# Patient Record
Sex: FEMALE | Race: WHITE | NOT HISPANIC OR LATINO | URBAN - METROPOLITAN AREA
[De-identification: names, ages, dates, MRNs, and addresses within clinical notes are randomized per-mention and may not be internally consistent; named-entity substitution may affect disease eponyms.]

---

## 2017-03-21 ENCOUNTER — HISTORICAL (OUTPATIENT)
Dept: ADMINISTRATIVE | Facility: HOSPITAL | Age: 47
End: 2017-03-21

## 2017-06-08 ENCOUNTER — HISTORICAL (OUTPATIENT)
Dept: CARDIOLOGY | Facility: CLINIC | Age: 47
End: 2017-06-08

## 2017-06-20 ENCOUNTER — HISTORICAL (OUTPATIENT)
Dept: RADIOLOGY | Facility: HOSPITAL | Age: 47
End: 2017-06-20

## 2017-06-20 LAB
ABS NEUT (OLG): 3.53 X10(3)/MCL (ref 2.1–9.2)
ALBUMIN SERPL-MCNC: 3.8 GM/DL (ref 3.4–5)
ALBUMIN/GLOB SERPL: 1 {RATIO}
ALP SERPL-CCNC: 123 UNIT/L (ref 20–120)
ALT SERPL-CCNC: 23 UNIT/L
ANISOCYTOSIS BLD QL SMEAR: NORMAL
AST SERPL-CCNC: 28 UNIT/L
BASOPHILS NFR BLD MANUAL: 0 %
BILIRUB SERPL-MCNC: 0.5 MG/DL
BILIRUBIN DIRECT+TOT PNL SERPL-MCNC: <0.1 MG/DL
BILIRUBIN DIRECT+TOT PNL SERPL-MCNC: >0.4 MG/DL
BUN SERPL-MCNC: 14 MG/DL (ref 7–25)
CALCIUM SERPL-MCNC: 9.2 MG/DL (ref 8.4–10.3)
CHLORIDE SERPL-SCNC: 106 MMOL/L (ref 96–110)
CHOLEST SERPL-MCNC: 156 MG/DL
CHOLEST/HDLC SERPL: 5.2 {RATIO} (ref 0–4.4)
CO2 SERPL-SCNC: 29 MMOL/L (ref 24–32)
CREAT SERPL-MCNC: 0.74 MG/DL (ref 0.7–1.1)
EOSINOPHIL NFR BLD MANUAL: 4 %
ERYTHROCYTE [DISTWIDTH] IN BLOOD BY AUTOMATED COUNT: 14.3 % (ref 11.5–14.5)
GLOBULIN SER-MCNC: 3.5 GM/ML (ref 2.3–3.5)
GLUCOSE SERPL-MCNC: 95 MG/DL (ref 65–99)
GRANULOCYTES NFR BLD MANUAL: 52 %
HAV IGM SERPL QL IA: NONREACTIVE
HBV CORE IGM SERPL QL IA: NONREACTIVE
HBV SURFACE AG SERPL QL IA: NEGATIVE
HCT VFR BLD AUTO: 38.1 % (ref 35–46)
HCV AB SERPL QL IA: NONREACTIVE
HDLC SERPL-MCNC: 30 MG/DL
HGB BLD-MCNC: 12.4 GM/DL (ref 12–16)
LDLC SERPL CALC-MCNC: 100 MG/DL (ref 0–130)
LYMPHOCYTES NFR BLD MANUAL: 28 %
MCH RBC QN AUTO: 28.8 PG (ref 26–34)
MCHC RBC AUTO-ENTMCNC: 32.5 GM/DL (ref 31–37)
MCV RBC AUTO: 88.4 FL (ref 80–100)
MONOCYTES NFR BLD MANUAL: 6 %
NEUTS BAND NFR BLD MANUAL: 10 %
PLATELET # BLD AUTO: 299 X10(3)/MCL (ref 130–400)
PLATELET # BLD EST: NORMAL 10*3/UL
PMV BLD AUTO: 10.2 FL (ref 7.4–10.4)
POTASSIUM SERPL-SCNC: 4.4 MMOL/L (ref 3.6–5.2)
PROT SERPL-MCNC: 7.3 GM/DL (ref 6–8)
RBC # BLD AUTO: 4.31 X10(6)/MCL (ref 4–5.2)
RBC MORPH BLD: NORMAL
SODIUM SERPL-SCNC: 141 MMOL/L (ref 135–146)
T4 FREE SERPL-MCNC: 0.89 NG/DL (ref 0.6–1.15)
TRIGL SERPL-MCNC: 128 MG/DL
TSH SERPL-ACNC: 2.17 MIU/L (ref 0.5–5)
VLDLC SERPL CALC-MCNC: 26 MG/DL
WBC # SPEC AUTO: 6.4 X10(3)/MCL (ref 4.5–11)

## 2017-09-19 LAB — RAPID GROUP A STREP (OHS): NEGATIVE

## 2017-11-13 ENCOUNTER — HISTORICAL (OUTPATIENT)
Dept: ADMINISTRATIVE | Facility: HOSPITAL | Age: 47
End: 2017-11-13

## 2017-11-13 LAB
ABS NEUT (OLG): 2.32 X10(3)/MCL (ref 2.1–9.2)
BASOPHILS NFR BLD MANUAL: 1 % (ref 0–2)
BUN SERPL-MCNC: 15 MG/DL (ref 7–18)
CALCIUM SERPL-MCNC: 9.5 MG/DL (ref 8.5–10.1)
CHLORIDE SERPL-SCNC: 105 MMOL/L (ref 98–107)
CO2 SERPL-SCNC: 27 MMOL/L (ref 21–32)
CREAT SERPL-MCNC: 0.67 MG/DL (ref 0.55–1.02)
EOSINOPHIL NFR BLD MANUAL: 4 % (ref 0–8)
ERYTHROCYTE [DISTWIDTH] IN BLOOD BY AUTOMATED COUNT: 13.5 % (ref 11.5–17)
GLUCOSE SERPL-MCNC: 87 MG/DL (ref 74–106)
HCT VFR BLD AUTO: 35.2 % (ref 37–47)
HGB BLD-MCNC: 11.5 GM/DL (ref 12–16)
LYMPHOCYTES NFR BLD MANUAL: 24 % (ref 13–40)
MCH RBC QN AUTO: 29.4 PG (ref 27–31)
MCHC RBC AUTO-ENTMCNC: 32.7 GM/DL (ref 33–36)
MCV RBC AUTO: 90 FL (ref 80–94)
MONOCYTES NFR BLD MANUAL: 13 % (ref 2–11)
NEUTROPHILS NFR BLD MANUAL: 58 % (ref 47–80)
PLATELET # BLD AUTO: 244 X10(3)/MCL (ref 130–400)
PLATELET # BLD EST: NORMAL 10*3/UL
PMV BLD AUTO: 9.8 FL (ref 7.4–10.4)
POIKILOCYTOSIS BLD QL SMEAR: 1
POTASSIUM SERPL-SCNC: 3.7 MMOL/L (ref 3.5–5.1)
RBC # BLD AUTO: 3.91 X10(6)/MCL (ref 4.2–5.4)
SODIUM SERPL-SCNC: 140 MMOL/L (ref 136–145)
WBC # SPEC AUTO: 4.6 X10(3)/MCL (ref 4.5–11.5)

## 2018-08-01 ENCOUNTER — HISTORICAL (OUTPATIENT)
Dept: RESPIRATORY THERAPY | Facility: HOSPITAL | Age: 48
End: 2018-08-01

## 2022-04-10 ENCOUNTER — HISTORICAL (OUTPATIENT)
Dept: ADMINISTRATIVE | Facility: HOSPITAL | Age: 52
End: 2022-04-10

## 2022-04-11 ENCOUNTER — HISTORICAL (OUTPATIENT)
Dept: ADMINISTRATIVE | Facility: HOSPITAL | Age: 52
End: 2022-04-11

## 2022-04-28 VITALS
BODY MASS INDEX: 20.45 KG/M2 | WEIGHT: 134.94 LBS | HEIGHT: 68 IN | SYSTOLIC BLOOD PRESSURE: 127 MMHG | DIASTOLIC BLOOD PRESSURE: 78 MMHG | OXYGEN SATURATION: 100 %

## 2022-04-28 VITALS
HEIGHT: 68 IN | OXYGEN SATURATION: 100 % | WEIGHT: 134.94 LBS | SYSTOLIC BLOOD PRESSURE: 127 MMHG | BODY MASS INDEX: 20.45 KG/M2 | DIASTOLIC BLOOD PRESSURE: 78 MMHG

## 2022-08-26 ENCOUNTER — HOSPITAL ENCOUNTER (OUTPATIENT)
Facility: HOSPITAL | Age: 52
Discharge: HOME | End: 2022-08-26
Attending: INTERNAL MEDICINE | Admitting: INTERNAL MEDICINE
Payer: MEDICAID

## 2022-08-26 VITALS
HEART RATE: 62 BPM | DIASTOLIC BLOOD PRESSURE: 75 MMHG | SYSTOLIC BLOOD PRESSURE: 103 MMHG | OXYGEN SATURATION: 93 % | HEIGHT: 71 IN | TEMPERATURE: 98 F | RESPIRATION RATE: 17 BRPM | BODY MASS INDEX: 22.72 KG/M2 | WEIGHT: 162.25 LBS

## 2022-08-26 DIAGNOSIS — I27.20 PROGRESSIVE PULMONARY HYPERTENSION: ICD-10-CM

## 2022-08-26 PROCEDURE — C1894 INTRO/SHEATH, NON-LASER: HCPCS | Performed by: INTERNAL MEDICINE

## 2022-08-26 PROCEDURE — 93451 RIGHT HEART CATH: CPT | Performed by: INTERNAL MEDICINE

## 2022-08-26 PROCEDURE — C1751 CATH, INF, PER/CENT/MIDLINE: HCPCS | Performed by: INTERNAL MEDICINE

## 2022-08-26 PROCEDURE — 99153 MOD SED SAME PHYS/QHP EA: CPT | Performed by: INTERNAL MEDICINE

## 2022-08-26 PROCEDURE — 99152 MOD SED SAME PHYS/QHP 5/>YRS: CPT | Performed by: INTERNAL MEDICINE

## 2022-08-26 PROCEDURE — 25000003 PHARM REV CODE 250: Performed by: INTERNAL MEDICINE

## 2022-08-26 PROCEDURE — 63600175 PHARM REV CODE 636 W HCPCS: Performed by: INTERNAL MEDICINE

## 2022-08-26 PROCEDURE — C1769 GUIDE WIRE: HCPCS | Performed by: INTERNAL MEDICINE

## 2022-08-26 RX ORDER — ACETAMINOPHEN 325 MG/1
650 TABLET ORAL EVERY 4 HOURS PRN
Status: DISCONTINUED | OUTPATIENT
Start: 2022-08-26 | End: 2022-08-26 | Stop reason: HOSPADM

## 2022-08-26 RX ORDER — FUROSEMIDE 40 MG/1
40 TABLET ORAL DAILY
COMMUNITY

## 2022-08-26 RX ORDER — POTASSIUM CHLORIDE 750 MG/1
20 TABLET, EXTENDED RELEASE ORAL ONCE
COMMUNITY

## 2022-08-26 RX ORDER — FENTANYL CITRATE 50 UG/ML
INJECTION, SOLUTION INTRAMUSCULAR; INTRAVENOUS
Status: DISCONTINUED | OUTPATIENT
Start: 2022-08-26 | End: 2022-08-26 | Stop reason: HOSPADM

## 2022-08-26 RX ORDER — DIAZEPAM 5 MG/1
10 TABLET ORAL ONCE
Status: COMPLETED | OUTPATIENT
Start: 2022-08-26 | End: 2022-08-26

## 2022-08-26 RX ORDER — ONDANSETRON 4 MG/1
8 TABLET, ORALLY DISINTEGRATING ORAL EVERY 8 HOURS PRN
Status: DISCONTINUED | OUTPATIENT
Start: 2022-08-26 | End: 2022-08-26 | Stop reason: HOSPADM

## 2022-08-26 RX ORDER — DIPHENHYDRAMINE HCL 25 MG
50 CAPSULE ORAL
Status: DISPENSED | OUTPATIENT
Start: 2022-08-26

## 2022-08-26 RX ORDER — DILTIAZEM HYDROCHLORIDE 30 MG/1
30 TABLET, FILM COATED ORAL 3 TIMES DAILY
COMMUNITY

## 2022-08-26 RX ORDER — MIDAZOLAM HYDROCHLORIDE 1 MG/ML
INJECTION, SOLUTION INTRAMUSCULAR; INTRAVENOUS
Status: DISCONTINUED | OUTPATIENT
Start: 2022-08-26 | End: 2022-08-26 | Stop reason: HOSPADM

## 2022-08-26 RX ORDER — LIDOCAINE HYDROCHLORIDE 10 MG/ML
INJECTION INFILTRATION; PERINEURAL
Status: DISCONTINUED | OUTPATIENT
Start: 2022-08-26 | End: 2022-08-26 | Stop reason: HOSPADM

## 2022-08-26 RX ADMIN — DIPHENHYDRAMINE HYDROCHLORIDE 50 MG: 25 CAPSULE ORAL at 06:08

## 2022-08-26 RX ADMIN — DIAZEPAM 10 MG: 5 TABLET ORAL at 06:08

## 2022-08-26 NOTE — BRIEF OP NOTE
Brief Operative Note:    : Rogelio Kaufman MD     Referring Physician: Self,Aaareferral     All Operators: Surgeon(s):  Rogelio Kaufman MD     Preoperative Diagnosis: Progressive pulmonary hypertension [I27.20]     Postop Diagnosis: Progressive pulmonary hypertension [I27.20]    Treatments/Procedures: Procedure(s) (LRB):  INSERTION, CATHETER, RIGHT HEART (Right)    Findings: RHC done via LCF vein. See catheterization report for full details.    Estimated Blood loss: 20 cc    Specimens removed: No    Rogelio Kaufman MD

## 2022-08-26 NOTE — DISCHARGE SUMMARY
[facility] - Cath Lab Services  Discharge Note  Short Stay    Procedure(s) (LRB):  INSERTION, CATHETER, RIGHT HEART (Right)    OUTCOME: Patient tolerated treatment/procedure well without complication and is now ready for discharge.    DISPOSITION: Home or Self Care    FINAL DIAGNOSIS:  Secundum ASD    FOLLOWUP: In clinic    DISCHARGE INSTRUCTIONS:    Discharge Procedure Orders   Diet general        TIME SPENT ON DISCHARGE: 31 minutes

## 2022-08-26 NOTE — NURSING
When giving pt DC instructions, she kept asking why she couldn't drive herself home. I explained to her that due to medications given during procedure (fentanyl and versed) and valium by mouth. Reiterated that she would not be able to drive for at least 48 hours. Pt states that it isn't her car and that her mother cant drive that car. Mother stated she would drive pt home. I then told mother she could go get the car, they then started saying that pt had to go with mother to get the car because she didn't know where it was parked, what the car looked like, or how to start the car. I explained to them that we couldn't bring her to get the car, as we had to see pt actually get in the car with mother driving. Stated they understood. Informed charge nurse JAMIA Chew of this. He spoke with pt and mother, and he reiterated everything I had told them, they said okay.Pt wheeled out with pt transport

## 2022-08-26 NOTE — INTERVAL H&P NOTE
Patient name: Letitia Gibson  MRN: [id number]  : 1970  Cath Lab Procedure H&P Update    Pre-Procedure Assessment:    I saw and examined the patient face to face. The patient has been re-evaluated and her condition is unchanged. The reason for admission, procedure and care is still present.  Based on the patients H&P, pre-procedure physical exam, relevant diagnostic studies, NPO status and information obtained from the patient, I determine the patient is an appropriate candidate for the proposed procedure and anesthesia planned. I further certify the anesthesia risks, benefits and options have been explained to the patient to which she agrees as documented on the procedural consent.

## 2022-08-26 NOTE — Clinical Note
The PA catheter was inserted into the inferior vena cava. Hemodynamics were performed. repositioned to RA, RV, PA and wedge. sats obtained

## 2022-09-21 ENCOUNTER — HISTORICAL (OUTPATIENT)
Dept: ADMINISTRATIVE | Facility: HOSPITAL | Age: 52
End: 2022-09-21
Payer: MEDICAID

## 2022-10-04 ENCOUNTER — OFFICE VISIT (OUTPATIENT)
Dept: CARDIOLOGY | Facility: CLINIC | Age: 52
End: 2022-10-04
Payer: MEDICAID

## 2022-10-04 DIAGNOSIS — Q21.11 ASD (ATRIAL SEPTAL DEFECT), OSTIUM SECUNDUM: ICD-10-CM

## 2022-10-04 DIAGNOSIS — Q21.11 ASD (ATRIAL SEPTAL DEFECT), OSTIUM SECUNDUM: Primary | ICD-10-CM

## 2022-10-04 PROCEDURE — 99203 PR OFFICE/OUTPT VISIT, NEW, LEVL III, 30-44 MIN: ICD-10-PCS | Mod: 95,,, | Performed by: INTERNAL MEDICINE

## 2022-10-04 PROCEDURE — 1160F PR REVIEW ALL MEDS BY PRESCRIBER/CLIN PHARMACIST DOCUMENTED: ICD-10-PCS | Mod: CPTII,95,, | Performed by: INTERNAL MEDICINE

## 2022-10-04 PROCEDURE — 1159F PR MEDICATION LIST DOCUMENTED IN MEDICAL RECORD: ICD-10-PCS | Mod: CPTII,95,, | Performed by: INTERNAL MEDICINE

## 2022-10-04 PROCEDURE — 1159F MED LIST DOCD IN RCRD: CPT | Mod: CPTII,95,, | Performed by: INTERNAL MEDICINE

## 2022-10-04 PROCEDURE — 1160F RVW MEDS BY RX/DR IN RCRD: CPT | Mod: CPTII,95,, | Performed by: INTERNAL MEDICINE

## 2022-10-04 PROCEDURE — 99203 OFFICE O/P NEW LOW 30 MIN: CPT | Mod: 95,,, | Performed by: INTERNAL MEDICINE

## 2022-10-04 NOTE — PROGRESS NOTES
The patient location is: work  The chief complaint leading to consultation is: asd    Visit type: audio only    Face to Face time with patient: 20  45 minutes of total time spent on the encounter, which includes face to face time and non-face to face time preparing to see the patient (eg, review of tests), Obtaining and/or reviewing separately obtained history, Documenting clinical information in the electronic or other health record, Independently interpreting results (not separately reported) and communicating results to the patient/family/caregiver, or Care coordination (not separately reported).         Each patient to whom he or she provides medical services by telemedicine is:  (1) informed of the relationship between the physician and patient and the respective role of any other health care provider with respect to management of the patient; and (2) notified that he or she may decline to receive medical services by telemedicine and may withdraw from such care at any time.    Notes:   52-year-old female with ASD and significant step-up with left-to-right shunt referred by Dr. cristiana garcia in [place].  She is symptomatic with shortness of breath and chest pain which began in the last 6 months.  Her transesophageal echo, and right heart catheterization were performed in [place].  In discussion with her on the phone she is agreeable to ASD closure.  I have informed her that 1 of our nurses will call her and set her up for a clinic visit with blood draw and consent forms on day 1 with ASD closure planned for the following day.

## 2022-11-08 ENCOUNTER — OFFICE VISIT (OUTPATIENT)
Dept: CARDIOLOGY | Facility: CLINIC | Age: 52
End: 2022-11-08
Attending: INTERNAL MEDICINE
Payer: MEDICAID

## 2022-11-08 ENCOUNTER — HOSPITAL ENCOUNTER (OUTPATIENT)
Dept: CARDIOLOGY | Facility: HOSPITAL | Age: 52
Discharge: HOME | End: 2022-11-08
Attending: INTERNAL MEDICINE
Payer: MEDICAID

## 2022-11-08 VITALS
SYSTOLIC BLOOD PRESSURE: 110 MMHG | DIASTOLIC BLOOD PRESSURE: 68 MMHG | HEART RATE: 78 BPM | HEIGHT: 71 IN | BODY MASS INDEX: 22.68 KG/M2 | WEIGHT: 162 LBS

## 2022-11-08 VITALS
HEIGHT: 70 IN | DIASTOLIC BLOOD PRESSURE: 75 MMHG | SYSTOLIC BLOOD PRESSURE: 131 MMHG | OXYGEN SATURATION: 97 % | BODY MASS INDEX: 24.4 KG/M2 | HEART RATE: 69 BPM | WEIGHT: 170.44 LBS

## 2022-11-08 DIAGNOSIS — Q21.11 ASD (ATRIAL SEPTAL DEFECT), OSTIUM SECUNDUM: ICD-10-CM

## 2022-11-08 DIAGNOSIS — Q21.11 ASD (ATRIAL SEPTAL DEFECT), OSTIUM SECUNDUM: Primary | ICD-10-CM

## 2022-11-08 DIAGNOSIS — R07.89 OTHER CHEST PAIN: ICD-10-CM

## 2022-11-08 LAB
ASCENDING AORTA: 2.76 CM
AV INDEX (PROSTH): 0.84
AV MEAN GRADIENT: 2 MMHG
AV PEAK GRADIENT: 4 MMHG
AV VALVE AREA: 3.58 CM2
AV VELOCITY RATIO: 0.76
BSA FOR ECHO PROCEDURE: 1.92 M2
CV ECHO LV RWT: 0.36 CM
DOP CALC AO PEAK VEL: 1 M/S
DOP CALC AO VTI: 20.2 CM
DOP CALC LVOT AREA: 4.3 CM2
DOP CALC LVOT DIAMETER: 2.33 CM
DOP CALC LVOT PEAK VEL: 0.76 M/S
DOP CALC LVOT STROKE VOLUME: 72.41 CM3
DOP CALCLVOT PEAK VEL VTI: 16.99 CM
E WAVE DECELERATION TIME: 240.68 MSEC
E/A RATIO: 0.84
E/E' RATIO: 8 M/S
ECHO LV POSTERIOR WALL: 0.76 CM (ref 0.6–1.1)
EJECTION FRACTION: 60 %
FRACTIONAL SHORTENING: 33 % (ref 28–44)
INTERVENTRICULAR SEPTUM: 0.78 CM (ref 0.6–1.1)
IVRT: 108.47 MSEC
LA MAJOR: 5.94 CM
LA MINOR: 5.38 CM
LA WIDTH: 3.86 CM
LEFT ATRIUM SIZE: 3.53 CM
LEFT ATRIUM VOLUME INDEX MOD: 32.2 ML/M2
LEFT ATRIUM VOLUME INDEX: 33.9 ML/M2
LEFT ATRIUM VOLUME MOD: 62.17 CM3
LEFT ATRIUM VOLUME: 65.39 CM3
LEFT INTERNAL DIMENSION IN SYSTOLE: 2.78 CM (ref 2.1–4)
LEFT VENTRICLE DIASTOLIC VOLUME INDEX: 40.04 ML/M2
LEFT VENTRICLE DIASTOLIC VOLUME: 77.27 ML
LEFT VENTRICLE MASS INDEX: 49 G/M2
LEFT VENTRICLE SYSTOLIC VOLUME INDEX: 15 ML/M2
LEFT VENTRICLE SYSTOLIC VOLUME: 28.91 ML
LEFT VENTRICULAR INTERNAL DIMENSION IN DIASTOLE: 4.17 CM (ref 3.5–6)
LEFT VENTRICULAR MASS: 95.16 G
LV LATERAL E/E' RATIO: 5.78 M/S
LV SEPTAL E/E' RATIO: 13 M/S
MV A" WAVE DURATION": 9.42 MSEC
MV PEAK A VEL: 0.62 M/S
MV PEAK E VEL: 0.52 M/S
MV STENOSIS PRESSURE HALF TIME: 69.8 MS
MV VALVE AREA P 1/2 METHOD: 3.15 CM2
PULM VEIN S/D RATIO: 1.78
PV PEAK D VEL: 0.46 M/S
PV PEAK S VEL: 0.82 M/S
RA MAJOR: 5.83 CM
RA PRESSURE: 3 MMHG
RA WIDTH: 4.91 CM
RIGHT VENTRICULAR END-DIASTOLIC DIMENSION: 5.27 CM
RV TISSUE DOPPLER FREE WALL SYSTOLIC VELOCITY 1 (APICAL 4 CHAMBER VIEW): 9.44 CM/S
SINUS: 3.13 CM
STJ: 2.84 CM
TDI LATERAL: 0.09 M/S
TDI SEPTAL: 0.04 M/S
TDI: 0.07 M/S

## 2022-11-08 PROCEDURE — 3075F PR MOST RECENT SYSTOLIC BLOOD PRESS GE 130-139MM HG: ICD-10-PCS | Mod: CPTII,,, | Performed by: INTERNAL MEDICINE

## 2022-11-08 PROCEDURE — 99999 PR PBB SHADOW E&M-EST. PATIENT-LVL III: CPT | Mod: PBBFAC,,, | Performed by: INTERNAL MEDICINE

## 2022-11-08 PROCEDURE — 93306 TTE W/DOPPLER COMPLETE: CPT | Mod: 26,,, | Performed by: INTERNAL MEDICINE

## 2022-11-08 PROCEDURE — 3078F PR MOST RECENT DIASTOLIC BLOOD PRESSURE < 80 MM HG: ICD-10-PCS | Mod: CPTII,,, | Performed by: INTERNAL MEDICINE

## 2022-11-08 PROCEDURE — 3078F DIAST BP <80 MM HG: CPT | Mod: CPTII,,, | Performed by: INTERNAL MEDICINE

## 2022-11-08 PROCEDURE — 3008F BODY MASS INDEX DOCD: CPT | Mod: CPTII,,, | Performed by: INTERNAL MEDICINE

## 2022-11-08 PROCEDURE — 3008F PR BODY MASS INDEX (BMI) DOCUMENTED: ICD-10-PCS | Mod: CPTII,,, | Performed by: INTERNAL MEDICINE

## 2022-11-08 PROCEDURE — 93306 TTE W/DOPPLER COMPLETE: CPT

## 2022-11-08 PROCEDURE — 1159F PR MEDICATION LIST DOCUMENTED IN MEDICAL RECORD: ICD-10-PCS | Mod: CPTII,,, | Performed by: INTERNAL MEDICINE

## 2022-11-08 PROCEDURE — 99213 OFFICE O/P EST LOW 20 MIN: CPT | Mod: PBBFAC,25 | Performed by: INTERNAL MEDICINE

## 2022-11-08 PROCEDURE — 1160F RVW MEDS BY RX/DR IN RCRD: CPT | Mod: CPTII,,, | Performed by: INTERNAL MEDICINE

## 2022-11-08 PROCEDURE — 93306 ECHO (CUPID ONLY): ICD-10-PCS | Mod: 26,,, | Performed by: INTERNAL MEDICINE

## 2022-11-08 PROCEDURE — 99215 OFFICE O/P EST HI 40 MIN: CPT | Mod: S$PBB,,, | Performed by: INTERNAL MEDICINE

## 2022-11-08 PROCEDURE — 99999 PR PBB SHADOW E&M-EST. PATIENT-LVL III: ICD-10-PCS | Mod: PBBFAC,,, | Performed by: INTERNAL MEDICINE

## 2022-11-08 PROCEDURE — 1160F PR REVIEW ALL MEDS BY PRESCRIBER/CLIN PHARMACIST DOCUMENTED: ICD-10-PCS | Mod: CPTII,,, | Performed by: INTERNAL MEDICINE

## 2022-11-08 PROCEDURE — 99215 PR OFFICE/OUTPT VISIT, EST, LEVL V, 40-54 MIN: ICD-10-PCS | Mod: S$PBB,,, | Performed by: INTERNAL MEDICINE

## 2022-11-08 PROCEDURE — 3075F SYST BP GE 130 - 139MM HG: CPT | Mod: CPTII,,, | Performed by: INTERNAL MEDICINE

## 2022-11-08 PROCEDURE — 1159F MED LIST DOCD IN RCRD: CPT | Mod: CPTII,,, | Performed by: INTERNAL MEDICINE

## 2022-11-08 NOTE — NURSING
Patient identified by 2 identifiers.   Allergies reviewed.  22 g IV placed to Rt AC .  Bubble study explained to patient, patient verbalized understanding.  Bubble performed, with & without Valsalva.  Pt tolerated procedure well.  IV d/c with catheter intact, pressure dsg applied.

## 2022-11-08 NOTE — ASSESSMENT & PLAN NOTE
Atypical chest pain, though patient has dyspnea on exertion, pulmonary HTN and long hx of smoking   Would recommend LHC   Discussed with patient to start Aspirin 81 mg daily   When she decides to proceed with LHC, she needs Plavix 300 mg times one then 75 mg daily prior to LHC

## 2022-11-08 NOTE — PROGRESS NOTES
PCP - Primary Doctor No  Subjective:   Patient ID:  Letitia Gibson is a 52 y.o. female who presents for evaluation for ASD closure, referred by Dr Kaufman      HPI: 53 yo Woman. Hx of known ASD and long history of smoking. Patient reports dyspnea on minimal to moderate exertion, NYHA class III symptoms, she had RHC at OSH that reported QP/QS 1.3 and systolic pulmonary pressure of 67. Patient also reports chest pressure the pressure is constant at rest and with exertion, pain is worse with deep inspiration at sometimes.       History:     Past Medical History:   Diagnosis Date    Anxiety disorder, unspecified     ASD (atrial septal defect)     Murmur      Past Surgical History:   Procedure Laterality Date    CARDIAC CATHETERIZATION  2017    HYSTERECTOMY      RIGHT HEART CATHETERIZATION Right 8/26/2022    Procedure: INSERTION, CATHETER, RIGHT HEART;  Surgeon: Rogelio Kaufman MD;  Location: [facility] CATH LAB;  Service: Cardiology;  Laterality: Right;  RHC W/ SHUNT RUN     Social History     Tobacco Use    Smoking status: Every Day     Packs/day: 1.50     Types: Cigarettes    Smokeless tobacco: Never   Substance Use Topics    Alcohol use: Not Currently     History reviewed. No pertinent family history.    Meds:     Review of patient's allergies indicates:   Allergen Reactions    Codeine        Current Outpatient Medications:     diltiaZEM (CARDIZEM) 30 MG tablet, Take 30 mg by mouth 3 (three) times daily., Disp: , Rfl:     furosemide (LASIX) 40 MG tablet, Take 40 mg by mouth once daily., Disp: , Rfl:     potassium chloride (KLOR-CON) 10 MEQ TbSR, Take 20 mEq by mouth once., Disp: , Rfl:   No current facility-administered medications for this visit.    Facility-Administered Medications Ordered in Other Visits:     diphenhydrAMINE capsule 50 mg, 50 mg, Oral, On Call Procedure, Rogelio Kaufman MD, 50 mg at 08/26/22 0654      Review of Systems   Constitutional:  Negative for chills and fever.   HENT:  Negative for  "tinnitus.    Eyes:  Negative for double vision.   Respiratory:  Positive for shortness of breath.    Cardiovascular:  Positive for chest pain and leg swelling. Negative for orthopnea.   Gastrointestinal:  Negative for abdominal pain and vomiting.   Genitourinary:  Negative for urgency.   Musculoskeletal:  Negative for myalgias.   Skin:  Negative for rash.     Objective:   /75 (BP Location: Right arm, Patient Position: Sitting, BP Method: Large (Automatic))   Pulse 69   Ht 5' 10" (1.778 m)   Wt 77.3 kg (170 lb 6.7 oz)   SpO2 97%   BMI 24.45 kg/m²   Physical Exam  Gen awake and alert, poor dental   Neck supple  Heart RRR, no appreciated M/R/G  Lungs CTA B  Abdomen soft non tender  Ext trace edema   Neuro no focal deficits   Labs:     Lab Results   Component Value Date     11/08/2022    K 5.1 11/08/2022     11/08/2022    CO2 26 11/08/2022    BUN 12 11/08/2022    CREATININE 0.8 11/08/2022    GLUCOSE 113 (H) 12/25/2020    ANIONGAP 7 (L) 11/08/2022     No results found for: HGBA1C  Lab Results   Component Value Date    BNP <20.0 05/26/2017       Lab Results   Component Value Date    WBC 11.16 11/08/2022    HGB 14.2 11/08/2022    HCT 43.0 11/08/2022     11/08/2022     Lab Results   Component Value Date    CHOL 156 06/20/2017    HDL 30 (L) 06/20/2017    TRIG 128 06/20/2017       Lab Results   Component Value Date     11/08/2022    K 5.1 11/08/2022     11/08/2022    CO2 26 11/08/2022    BUN 12 11/08/2022    CREATININE 0.8 11/08/2022    GLUCOSE 113 (H) 12/25/2020    ANIONGAP 7 (L) 11/08/2022     No results found for: HGBA1C  Lab Results   Component Value Date    BNP <20.0 05/26/2017    Lab Results   Component Value Date    WBC 11.16 11/08/2022    HGB 14.2 11/08/2022    HCT 43.0 11/08/2022     11/08/2022     Lab Results   Component Value Date    CHOL 156 06/20/2017    HDL 30 (L) 06/20/2017    TRIG 128 06/20/2017                Cardiovascular Imaging:       Echo:   The estimated " ejection fraction is 60%.  Study is positive for intercardiac shunt.  The left ventricle is normal in size with normal systolic function.  Trivial circumferential pericardial effusion.  There is abnormal septal wall motion. There is diastolic flattening of the interventricular septum consistent with right ventricle volume overload.  Mild aortic regurgitation.  Mild tricuspid regurgitation.  Mild to moderate pulmonic regurgitation.  Moderate right ventricular enlargement with moderately reduced right ventricular systolic function.  There is right ventricular hypertrophy.  Moderate right atrial enlargement.  Normal central venous pressure (3 mmHg).  Poor TR envelop for estimation of PA pressure. Mean PA pressure 40mmHg by HI.  There is an ASD with left to right flow, demonstrated Images 38-40,        Assessment & Plan:   ASD (atrial septal defect), ostium secundum  RHC at OSH and Echo are consistent with ASD with left to right flow and pulmonary HTN.   _ Patient has poor dental hygiene, it will increases risk of endocarditis if she has an ASD closure device, we recommend she follows with a dentist and to have this addressed prior to ASD closure device   _ At the time of the procedure will do right heart cath and measure pulmonary artery pressure   _ Consent was obtained for both ASD closure and right heart cath, procedures were explained to patient with potential benefits and risks, patient verbalized understanding.     Other chest pain  Atypical chest pain, though patient has dyspnea on exertion, pulmonary HTN and long hx of smoking   Would recommend LHC   Discussed with patient to start Aspirin 81 mg daily   When she decides to proceed with LHC, she needs Plavix 300 mg times one then 75 mg daily prior to LHC           Signed:  Andrae Jamison MD  Interventional fellow   Interventional Cardiology Staff  I have personally taken the history and examined this patient. I have discussed and agree with the resident's findings  and plan as documented in the resident's note. ASD, Pulmonary HTN, impaired RV function and thickening referred for closure. She is syptomatic, and candidate for closure with RHC evaluation and PVR/woods calculation prior to implant. She understands that dental caries has to be addressed prior to procedure. She will address this in [place] and return afterwards.     Emmanuel Todd

## 2022-11-08 NOTE — ASSESSMENT & PLAN NOTE
RHC at OSH and Echo are consistent with ASD with left to right flow and pulmonary HTN.   _ Patient has poor dental hygiene, it will increases risk of endocarditis if she has an ASD closure device, we recommend she follows with a dentist and to have this addressed prior to ASD closure device   _ At the time of the procedure will do right heart cath and measure pulmonary artery pressure   _ Consent was obtained for both ASD closure and right heart cath, procedures were explained to patient with potential benefits and risks, patient verbalized understanding.

## 2023-05-22 DIAGNOSIS — R06.02 SOB (SHORTNESS OF BREATH): Primary | ICD-10-CM

## 2023-06-26 ENCOUNTER — HOSPITAL ENCOUNTER (OUTPATIENT)
Dept: RADIOLOGY | Facility: HOSPITAL | Age: 53
Discharge: HOME | End: 2023-06-26
Attending: INTERNAL MEDICINE
Payer: COMMERCIAL

## 2023-06-26 ENCOUNTER — CLINICAL SUPPORT (OUTPATIENT)
Dept: PULMONOLOGY | Facility: CLINIC | Age: 53
End: 2023-06-26
Payer: COMMERCIAL

## 2023-06-26 ENCOUNTER — OFFICE VISIT (OUTPATIENT)
Dept: PULMONOLOGY | Facility: CLINIC | Age: 53
End: 2023-06-26
Payer: COMMERCIAL

## 2023-06-26 VITALS
OXYGEN SATURATION: 98 % | HEART RATE: 80 BPM | SYSTOLIC BLOOD PRESSURE: 124 MMHG | BODY MASS INDEX: 23.8 KG/M2 | HEIGHT: 71 IN | WEIGHT: 170 LBS | RESPIRATION RATE: 17 BRPM | DIASTOLIC BLOOD PRESSURE: 76 MMHG

## 2023-06-26 DIAGNOSIS — Z77.098 EXPOSURE TO INDUSTRIAL FUMES: Primary | ICD-10-CM

## 2023-06-26 DIAGNOSIS — F17.210 NICOTINE DEPENDENCE, CIGARETTES, UNCOMPLICATED: ICD-10-CM

## 2023-06-26 DIAGNOSIS — R09.02 EXERCISE HYPOXEMIA: ICD-10-CM

## 2023-06-26 DIAGNOSIS — R06.02 SOB (SHORTNESS OF BREATH): ICD-10-CM

## 2023-06-26 DIAGNOSIS — R05.8 OTHER COUGH: ICD-10-CM

## 2023-06-26 DIAGNOSIS — F17.200 SMOKER: ICD-10-CM

## 2023-06-26 DIAGNOSIS — J44.89 ASTHMA WITH COPD: ICD-10-CM

## 2023-06-26 PROCEDURE — 94729 DIFFUSING CAPACITY: CPT | Mod: S$GLB,,, | Performed by: INTERNAL MEDICINE

## 2023-06-26 PROCEDURE — 71046 XR CHEST PA AND LATERAL: ICD-10-PCS | Mod: 26,,, | Performed by: RADIOLOGY

## 2023-06-26 PROCEDURE — 94060 PR EVAL OF BRONCHOSPASM: ICD-10-PCS | Mod: S$GLB,,, | Performed by: INTERNAL MEDICINE

## 2023-06-26 PROCEDURE — 94729 DIFFUSING CAPACITY: CPT | Mod: PBBFAC

## 2023-06-26 PROCEDURE — 71046 X-RAY EXAM CHEST 2 VIEWS: CPT | Mod: 26,,, | Performed by: RADIOLOGY

## 2023-06-26 PROCEDURE — 71046 X-RAY EXAM CHEST 2 VIEWS: CPT | Mod: TC

## 2023-06-26 PROCEDURE — 94060 EVALUATION OF WHEEZING: CPT | Mod: PBBFAC

## 2023-06-26 PROCEDURE — 99205 PR OFFICE/OUTPT VISIT, NEW, LEVL V, 60-74 MIN: ICD-10-PCS | Mod: 25,S$GLB,, | Performed by: INTERNAL MEDICINE

## 2023-06-26 PROCEDURE — 94726 PULM FUNCT TST PLETHYSMOGRAP: ICD-10-PCS | Mod: S$GLB,,, | Performed by: INTERNAL MEDICINE

## 2023-06-26 PROCEDURE — 94060 EVALUATION OF WHEEZING: CPT | Mod: S$GLB,,, | Performed by: INTERNAL MEDICINE

## 2023-06-26 PROCEDURE — 99214 OFFICE O/P EST MOD 30 MIN: CPT | Mod: PBBFAC,25 | Performed by: INTERNAL MEDICINE

## 2023-06-26 PROCEDURE — 99999 PR PBB SHADOW E&M-EST. PATIENT-LVL IV: ICD-10-PCS | Mod: PBBFAC,,, | Performed by: INTERNAL MEDICINE

## 2023-06-26 PROCEDURE — 99999 PR PBB SHADOW E&M-EST. PATIENT-LVL IV: CPT | Mod: PBBFAC,,, | Performed by: INTERNAL MEDICINE

## 2023-06-26 PROCEDURE — 94726 PLETHYSMOGRAPHY LUNG VOLUMES: CPT | Mod: S$GLB,,, | Performed by: INTERNAL MEDICINE

## 2023-06-26 PROCEDURE — 99205 OFFICE O/P NEW HI 60 MIN: CPT | Mod: 25,S$GLB,, | Performed by: INTERNAL MEDICINE

## 2023-06-26 PROCEDURE — 94726 PLETHYSMOGRAPHY LUNG VOLUMES: CPT | Mod: PBBFAC

## 2023-06-26 PROCEDURE — 94729 PR C02/MEMBANE DIFFUSE CAPACITY: ICD-10-PCS | Mod: S$GLB,,, | Performed by: INTERNAL MEDICINE

## 2023-06-26 RX ORDER — ALBUTEROL SULFATE 90 UG/1
2 AEROSOL, METERED RESPIRATORY (INHALATION) EVERY 4 HOURS PRN
Qty: 18 G | Refills: 11 | Status: SHIPPED | OUTPATIENT
Start: 2023-06-26

## 2023-06-26 RX ORDER — PREDNISONE 20 MG/1
TABLET ORAL
Qty: 20 TABLET | Refills: 0 | Status: SHIPPED | OUTPATIENT
Start: 2023-06-26

## 2023-06-26 RX ORDER — DOXYCYCLINE 100 MG/1
100 CAPSULE ORAL 2 TIMES DAILY
Qty: 20 CAPSULE | Refills: 0 | Status: SHIPPED | OUTPATIENT
Start: 2023-06-26

## 2023-06-26 RX ORDER — ALBUTEROL SULFATE 0.83 MG/ML
2.5 SOLUTION RESPIRATORY (INHALATION)
Qty: 360 ML | Refills: 11 | Status: SHIPPED | OUTPATIENT
Start: 2023-06-26 | End: 2024-06-25

## 2023-06-26 NOTE — PROGRESS NOTES
Subjective:     Patient ID: Letitia Gibson is a 52 y.o. female.    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    In same room as chemical spill -   Left in area for 30 - 40 minutes  Eyes started burning, unable to swallow and breath,   Threw up about 30 minutes later when brought outside  Sent home  Returned to work - had difficulty breathing  Next morning - had another spill and was brought in to break room and returned to the line and worked in it  Another spill on the following day   Almost every day had some sort of spill  Sensors were going off  Total days of exposure for 15 day  Last day working in May 26, 2023 ( approximately)    Left work about a month ago    Has not seen any physician - no urgent care  Seen by Worker's Comp doctor on 5/23 /2023 ( or about that date)   Reported growths on Chest X Ray   Was told by [facility] physician that she should not return to work    Last saw Dr. 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 Symptoms occur at rest. Symptoms began 7 weeks ago, gradually worsening since. Associated symptoms include  difficulty breathing, dyspnea on exertion, productive cough, shortness of breath, sputum production, tightness in chest, and wheezing. Cough and night .She denies hemoptysis na . She does not have had recent travel. Weight has been stable. Symptoms are exacerbated by any exercise. Symptoms are alleviated by rest. Worse at night  16 pack year history of smoking  Still smoking    History of ASD       Past Medical  History:   Diagnosis Date    Anxiety disorder, unspecified     ASD (atrial septal defect)     Murmur      Past Surgical History:   Procedure Laterality Date    CARDIAC CATHETERIZATION  2017    HYSTERECTOMY      RIGHT HEART CATHETERIZATION Right 8/26/2022    Procedure: INSERTION, CATHETER, RIGHT HEART;  Surgeon: Rogelio Kaufman MD;  Location: [facility] CATH LAB;  Service: Cardiology;  Laterality: Right;  RHC W/ SHUNT RUN     Review of patient's allergies indicates:   Allergen Reactions    Codeine      Current Outpatient Medications on File Prior to Visit   Medication Sig Dispense Refill    diltiaZEM (CARDIZEM) 30 MG tablet Take 30 mg by mouth 3 (three) times daily.      furosemide (LASIX) 40 MG tablet Take 40 mg by mouth once daily.      potassium chloride (KLOR-CON) 10 MEQ TbSR Take 20 mEq by mouth once.       Current Facility-Administered Medications on File Prior to Visit   Medication Dose Route Frequency Provider Last Rate Last Admin    diphenhydrAMINE capsule 50 mg  50 mg Oral On Call Procedure Rogelio Kaufman MD   50 mg at 08/26/22 0654     Social History     Socioeconomic History    Marital status:    Tobacco Use    Smoking status: Every Day     Packs/day: 0.50     Years: 32.00     Pack years: 16.00     Types: Cigarettes     Start date: 1/1/1989    Smokeless tobacco: Never   Substance and Sexual Activity    Alcohol use: Not Currently     History reviewed. No pertinent family history.    Review of Systems   Constitutional:  Positive for fatigue. Negative for fever.   HENT:  Positive for postnasal drip, rhinorrhea and congestion.    Eyes:  Negative for redness and itching.   Respiratory:  Positive for cough, sputum production, shortness of breath, dyspnea on extertion, use of rescue inhaler and Paroxysmal Nocturnal Dyspnea.    Cardiovascular:  Negative for chest pain, palpitations and leg swelling.   Genitourinary:  Negative for difficulty urinating and hematuria.   Endocrine:  Negative for cold intolerance  "and heat intolerance.    Skin:  Negative for rash.   Gastrointestinal:  Negative for nausea and abdominal pain.   Neurological:  Negative for dizziness, syncope, weakness and light-headedness.   Hematological:  Negative for adenopathy. Does not bruise/bleed easily.   Psychiatric/Behavioral:  Positive for sleep disturbance. The patient is not nervous/anxious.      Objective:      /76   Pulse 80   Resp 17   Ht 5' 11" (1.803 m)   Wt 77.1 kg (169 lb 15.6 oz)   SpO2 98%   BMI 23.71 kg/m²   Physical Exam  Vitals and nursing note reviewed.   Constitutional:       Appearance: She is well-developed.   HENT:      Head: Normocephalic and atraumatic.      Nose: Nose normal.      Mouth/Throat:      Pharynx: No oropharyngeal exudate.   Eyes:      Conjunctiva/sclera: Conjunctivae normal.      Pupils: Pupils are equal, round, and reactive to light.   Neck:      Thyroid: No thyromegaly.      Vascular: No JVD.      Trachea: No tracheal deviation.   Cardiovascular:      Rate and Rhythm: Normal rate and regular rhythm.      Heart sounds: Murmur heard.   Pulmonary:      Effort: Pulmonary effort is normal. No respiratory distress.      Breath sounds: Examination of the right-lower field reveals wheezing. Examination of the left-lower field reveals wheezing. Decreased breath sounds and wheezing present. No rhonchi or rales.   Chest:      Chest wall: No tenderness.   Abdominal:      General: Bowel sounds are normal.      Palpations: Abdomen is soft.   Musculoskeletal:         General: Normal range of motion.      Cervical back: Neck supple.   Lymphadenopathy:      Cervical: No cervical adenopathy.   Skin:     General: Skin is warm and dry.   Neurological:      Mental Status: She is alert and oriented to person, place, and time.     Personal Diagnostic Review  Spirometry shows a low FEF 25-75 suggesting mild obstruction may be present. Spirometry remains unimproved following bronchodilator. Lung volumes show mild restriction is " present. Overall baseline spirometry shows moderate ventilatory impairment. Airway   mechanics are abnormal showing increased airway resistance and decreased conductance. DLCO is moderately decreased. MVV is severely decreased.     Pulmonary Studies Review 6/26/2023   SpO2 98   Height 71   Weight 2719.59   BMI (Calculated) 23.7   Predicted Distance 421.12   Predicted Distance Meters (Calculated) 564.53       X-Ray Chest PA And Lateral  Narrative: EXAMINATION:  XR CHEST PA AND LATERAL    CLINICAL HISTORY:  SOB; Shortness of breath    TECHNIQUE:  PA and lateral views of the chest were performed.    COMPARISON:  Prior radiographs    FINDINGS:  Cardiac silhouette and mediastinal contours are unchanged.  Hilar prominence stable.  Lungs demonstrate no acute opacity.  No large pleural effusion.  Osseous structures are intact.  Impression: Similar appearance of the chest    Electronically signed by: Daniel Osullivan MD  Date:    06/26/2023  Time:    10:00      Office Spirometry Results:     Pulmonary Function Tests 6/26/2023   SpO2 98   Height 71   Weight 2719.59   BMI (Calculated) 23.7   Some recent data might be hidden     Pulmonary Studies Review 6/26/2023   SpO2 98   Height 71   Weight 2719.59   BMI (Calculated) 23.7   Predicted Distance 421.12   Predicted Distance Meters (Calculated) 564.53         Assessment:            Exposure to industrial fumes  -     predniSONE (DELTASONE) 20 MG tablet; Prednisone 60 mg/ day for 3 days, 40 mg/day for 3 days,20 mg/ day for 3 days, (1/2 tablet )10 mg a day for 3 days.  Dispense: 20 tablet; Refill: 0  -     NEBULIZER KIT (SUPPLIES) FOR HOME USE  -     albuterol (PROVENTIL) 2.5 mg /3 mL (0.083 %) nebulizer solution; Take 3 mLs (2.5 mg total) by nebulization every 4 to 6 hours as needed for Wheezing or Shortness of Breath.  Dispense: 360 mL; Refill: 11  -     NEBULIZER FOR HOME USE  -     doxycycline (VIBRAMYCIN) 100 MG Cap; Take 1 capsule (100 mg total) by mouth 2 (two) times daily.   Dispense: 20 capsule; Refill: 0  -     albuterol (PROVENTIL/VENTOLIN HFA) 90 mcg/actuation inhaler; Inhale 2 puffs into the lungs every 4 (four) hours as needed for Wheezing or Shortness of Breath.  Dispense: 18 g; Refill: 11  -     budesonide-glycopyr-formoterol 160-9-4.8 mcg/actuation HFAA; Inhale 2 puffs into the lungs 2 (two) times daily. Wash out mouth after use.  Dispense: 10.7 g; Refill: 11  -     Six Minute Walk Test to qualify for Home Oxygen; Future    Smoker  -     Ambulatory referral/consult to Smoking Cessation Program; Future; Expected date: 07/03/2023    Nicotine dependence, cigarettes, uncomplicated  -     Ambulatory referral/consult to Smoking Cessation Program; Future; Expected date: 07/03/2023    Asthma with COPD  -     predniSONE (DELTASONE) 20 MG tablet; Prednisone 60 mg/ day for 3 days, 40 mg/day for 3 days,20 mg/ day for 3 days, (1/2 tablet )10 mg a day for 3 days.  Dispense: 20 tablet; Refill: 0  -     NEBULIZER KIT (SUPPLIES) FOR HOME USE  -     albuterol (PROVENTIL) 2.5 mg /3 mL (0.083 %) nebulizer solution; Take 3 mLs (2.5 mg total) by nebulization every 4 to 6 hours as needed for Wheezing or Shortness of Breath.  Dispense: 360 mL; Refill: 11  -     NEBULIZER FOR HOME USE  -     doxycycline (VIBRAMYCIN) 100 MG Cap; Take 1 capsule (100 mg total) by mouth 2 (two) times daily.  Dispense: 20 capsule; Refill: 0  -     albuterol (PROVENTIL/VENTOLIN HFA) 90 mcg/actuation inhaler; Inhale 2 puffs into the lungs every 4 (four) hours as needed for Wheezing or Shortness of Breath.  Dispense: 18 g; Refill: 11  -     budesonide-glycopyr-formoterol 160-9-4.8 mcg/actuation HFAA; Inhale 2 puffs into the lungs 2 (two) times daily. Wash out mouth after use.  Dispense: 10.7 g; Refill: 11  -     Six Minute Walk Test to qualify for Home Oxygen; Future    Other cough  -     CT Chest Without Contrast; Future; Expected date: 06/26/2023    Exercise hypoxemia  -     Six Minute Walk Test to qualify for Home Oxygen;  Future          Outpatient Encounter Medications as of 6/26/2023   Medication Sig Dispense Refill    albuterol (PROVENTIL) 2.5 mg /3 mL (0.083 %) nebulizer solution Take 3 mLs (2.5 mg total) by nebulization every 4 to 6 hours as needed for Wheezing or Shortness of Breath. 360 mL 11    albuterol (PROVENTIL/VENTOLIN HFA) 90 mcg/actuation inhaler Inhale 2 puffs into the lungs every 4 (four) hours as needed for Wheezing or Shortness of Breath. 18 g 11    budesonide-glycopyr-formoterol 160-9-4.8 mcg/actuation HFAA Inhale 2 puffs into the lungs 2 (two) times daily. Wash out mouth after use. 10.7 g 11    diltiaZEM (CARDIZEM) 30 MG tablet Take 30 mg by mouth 3 (three) times daily.      doxycycline (VIBRAMYCIN) 100 MG Cap Take 1 capsule (100 mg total) by mouth 2 (two) times daily. 20 capsule 0    furosemide (LASIX) 40 MG tablet Take 40 mg by mouth once daily.      potassium chloride (KLOR-CON) 10 MEQ TbSR Take 20 mEq by mouth once.      predniSONE (DELTASONE) 20 MG tablet Prednisone 60 mg/ day for 3 days, 40 mg/day for 3 days,20 mg/ day for 3 days, (1/2 tablet )10 mg a day for 3 days. 20 tablet 0     Facility-Administered Encounter Medications as of 6/26/2023   Medication Dose Route Frequency Provider Last Rate Last Admin    diphenhydrAMINE capsule 50 mg  50 mg Oral On Call Procedure Rogelio Kaufman MD   50 mg at 08/26/22 0654     Plan:       Requested Prescriptions     Signed Prescriptions Disp Refills    predniSONE (DELTASONE) 20 MG tablet 20 tablet 0     Sig: Prednisone 60 mg/ day for 3 days, 40 mg/day for 3 days,20 mg/ day for 3 days, (1/2 tablet )10 mg a day for 3 days.    albuterol (PROVENTIL) 2.5 mg /3 mL (0.083 %) nebulizer solution 360 mL 11     Sig: Take 3 mLs (2.5 mg total) by nebulization every 4 to 6 hours as needed for Wheezing or Shortness of Breath.    doxycycline (VIBRAMYCIN) 100 MG Cap 20 capsule 0     Sig: Take 1 capsule (100 mg total) by mouth 2 (two) times daily.    albuterol (PROVENTIL/VENTOLIN HFA) 90  mcg/actuation inhaler 18 g 11     Sig: Inhale 2 puffs into the lungs every 4 (four) hours as needed for Wheezing or Shortness of Breath.    budesonide-glycopyr-formoterol 160-9-4.8 mcg/actuation HFAA 10.7 g 11     Sig: Inhale 2 puffs into the lungs 2 (two) times daily. Wash out mouth after use.     Problem List Items Addressed This Visit    None  Visit Diagnoses       Exposure to industrial fumes    -  Primary    Relevant Medications    predniSONE (DELTASONE) 20 MG tablet    albuterol (PROVENTIL) 2.5 mg /3 mL (0.083 %) nebulizer solution    doxycycline (VIBRAMYCIN) 100 MG Cap    albuterol (PROVENTIL/VENTOLIN HFA) 90 mcg/actuation inhaler    budesonide-glycopyr-formoterol 160-9-4.8 mcg/actuation HFAA    Other Relevant Orders    NEBULIZER KIT (SUPPLIES) FOR HOME USE    NEBULIZER FOR HOME USE    Six Minute Walk Test to qualify for Home Oxygen    Smoker        Relevant Orders    Ambulatory referral/consult to Smoking Cessation Program    Nicotine dependence, cigarettes, uncomplicated        Relevant Orders    Ambulatory referral/consult to Smoking Cessation Program    Asthma with COPD        Relevant Medications    predniSONE (DELTASONE) 20 MG tablet    albuterol (PROVENTIL) 2.5 mg /3 mL (0.083 %) nebulizer solution    doxycycline (VIBRAMYCIN) 100 MG Cap    albuterol (PROVENTIL/VENTOLIN HFA) 90 mcg/actuation inhaler    budesonide-glycopyr-formoterol 160-9-4.8 mcg/actuation HFAA    Other Relevant Orders    NEBULIZER KIT (SUPPLIES) FOR HOME USE    NEBULIZER FOR HOME USE    Six Minute Walk Test to qualify for Home Oxygen    Other cough        Relevant Orders    CT Chest Without Contrast    Exercise hypoxemia        Relevant Orders    Six Minute Walk Test to qualify for Home Oxygen               Follow up in about 4 weeks (around 7/24/2023) for 6 min walk - on return, Review CT/PET - on return visit.    MEDICAL DECISION MAKING: Moderate to high complexity.  Overall, the multiple problems listed are of moderate to high  severity that may impact quality of life and activities of daily living. Side effects of medications, treatment plan as well as options and alternatives reviewed and discussed with patient. There was counseling of patient concerning these issues.    Total time spent in counseling and coordination of care - 60  minutes of total time spent on the encounter, which includes face to face time and non-face to face time preparing to see the patient (eg, review of tests), Obtaining and/or reviewing separately obtained history, Documenting clinical information in the electronic or other health record, Independently interpreting results (not separately reported) and communicating results to the patient/family/caregiver, or Care coordination (not separately reported).    Time was used in discussion of prognosis, risks, benefits of treatment, instructions and compliance with regimen . Discussion with other physicians and/or health care providers - home health or for use of durable medical equipment (oxygen, nebulizers, CPAP, BiPAP) occurred.

## 2023-06-27 LAB
BRPFT: NORMAL
DLCO ADJ PRE: 12.26 ML/(MIN*MMHG)
DLCO SINGLE BREATH LLN: 22.3
DLCO SINGLE BREATH PRE REF: 43.7 %
DLCO SINGLE BREATH REF: 28.03
DLCOC SBVA LLN: 3.4
DLCOC SBVA PRE REF: 82.2 %
DLCOC SBVA REF: 4.6
DLCOC SINGLE BREATH LLN: 22.3
DLCOC SINGLE BREATH PRE REF: 43.7 %
DLCOC SINGLE BREATH REF: 28.03
DLCOVA LLN: 3.4
DLCOVA PRE REF: 82.2 %
DLCOVA PRE: 3.79 ML/(MIN*MMHG*L)
DLCOVA REF: 4.6
DLVAADJ PRE: 3.79 ML/(MIN*MMHG*L)
ERV LLN: -16449.02
ERV PRE REF: 5.1 %
ERV REF: 0.98
FEF 25 75 CHG: 50.3 %
FEF 25 75 LLN: 1.6
FEF 25 75 POST REF: 65.6 %
FEF 25 75 PRE REF: 43.7 %
FEF 25 75 REF: 2.94
FET100 CHG: -20.7 %
FEV1 CHG: 8.3 %
FEV1 FVC CHG: 11.3 %
FEV1 FVC LLN: 68
FEV1 FVC POST REF: 99.7 %
FEV1 FVC PRE REF: 89.6 %
FEV1 FVC REF: 79
FEV1 LLN: 2.55
FEV1 POST REF: 60.6 %
FEV1 PRE REF: 55.9 %
FEV1 REF: 3.31
FRCPLETH LLN: 2.26
FRCPLETH PREREF: 98 %
FRCPLETH REF: 3.09
FVC CHG: -2.6 %
FVC LLN: 3.26
FVC POST REF: 60.2 %
FVC PRE REF: 61.8 %
FVC REF: 4.22
IVC PRE: 1.43 L
IVC SINGLE BREATH LLN: 3.26
IVC SINGLE BREATH PRE REF: 33.8 %
IVC SINGLE BREATH REF: 4.22
MVV LLN: 108
MVV PRE REF: 35.3 %
MVV REF: 127
PEF CHG: -3.5 %
PEF LLN: 5.64
PEF POST REF: 44.4 %
PEF PRE REF: 46 %
PEF REF: 7.74
POST FEF 25 75: 1.93 L/S
POST FET 100: 5.14 SEC
POST FEV1 FVC: 78.94 %
POST FEV1: 2 L
POST FVC: 2.54 L
POST PEF: 3.44 L/S
PRE DLCO: 12.26 ML/(MIN*MMHG)
PRE ERV: 0.05 L
PRE FEF 25 75: 1.29 L/S
PRE FET 100: 6.49 SEC
PRE FEV1 FVC: 70.95 %
PRE FEV1: 1.85 L
PRE FRC PL: 3.02 L
PRE FVC: 2.61 L
PRE MVV: 45 L/MIN
PRE PEF: 3.56 L/S
PRE RV: 2.29 L
PRE TLC: 4.9 L
RAW LLN: 3.06
RAW PRE REF: 128.2 %
RAW PRE: 3.92 CMH2O*S/L
RAW REF: 3.06
RV LLN: 1.53
RV PRE REF: 109 %
RV REF: 2.11
RVTLC LLN: 27
RVTLC PRE REF: 126.6 %
RVTLC PRE: 46.8 %
RVTLC REF: 37
TLC LLN: 5.1
TLC PRE REF: 80.5 %
TLC REF: 6.09
VA PRE: 3.24 L
VA SINGLE BREATH LLN: 5.94
VA SINGLE BREATH PRE REF: 54.5 %
VA SINGLE BREATH REF: 5.94
VC LLN: 3.26
VC PRE REF: 61.8 %
VC PRE: 2.61 L
VC REF: 4.22
VTGRAWPRE: 3.01 L

## 2023-06-29 ENCOUNTER — TELEPHONE (OUTPATIENT)
Dept: PULMONOLOGY | Facility: CLINIC | Age: 53
End: 2023-06-29
Payer: MEDICAID

## 2023-06-29 NOTE — TELEPHONE ENCOUNTER
----- Message from Patti Canada sent at 6/29/2023 11:01 AM CDT -----  Contact: Curtis\Ariane Thakkar states this is the 2nd request for pulmonary notes for the pts workers comp claim. Please call her back at [phone].    Thanks  TS

## 2023-07-27 ENCOUNTER — CLINICAL SUPPORT (OUTPATIENT)
Dept: PULMONOLOGY | Facility: CLINIC | Age: 53
End: 2023-07-27
Payer: COMMERCIAL

## 2023-07-27 ENCOUNTER — HOSPITAL ENCOUNTER (OUTPATIENT)
Dept: RADIOLOGY | Facility: HOSPITAL | Age: 53
Discharge: HOME | End: 2023-07-27
Attending: INTERNAL MEDICINE
Payer: COMMERCIAL

## 2023-07-27 VITALS — WEIGHT: 173.5 LBS | BODY MASS INDEX: 24.29 KG/M2 | HEIGHT: 71 IN

## 2023-07-27 DIAGNOSIS — J44.89 ASTHMA WITH COPD: ICD-10-CM

## 2023-07-27 DIAGNOSIS — R09.02 EXERCISE HYPOXEMIA: ICD-10-CM

## 2023-07-27 DIAGNOSIS — Z77.098 EXPOSURE TO INDUSTRIAL FUMES: ICD-10-CM

## 2023-07-27 DIAGNOSIS — R05.8 OTHER COUGH: ICD-10-CM

## 2023-07-27 PROCEDURE — 71250 CT THORAX DX C-: CPT | Mod: 26,,, | Performed by: RADIOLOGY

## 2023-07-27 PROCEDURE — 94618 PULMONARY STRESS TESTING: ICD-10-PCS | Mod: S$GLB,,, | Performed by: INTERNAL MEDICINE

## 2023-07-27 PROCEDURE — 71250 CT CHEST WITHOUT CONTRAST: ICD-10-PCS | Mod: 26,,, | Performed by: RADIOLOGY

## 2023-07-27 PROCEDURE — 71250 CT THORAX DX C-: CPT | Mod: TC

## 2023-07-27 PROCEDURE — 94618 PULMONARY STRESS TESTING: CPT | Mod: S$GLB,,, | Performed by: INTERNAL MEDICINE

## 2023-07-27 PROCEDURE — 94618 PULMONARY STRESS TESTING: CPT | Mod: PBBFAC

## 2023-07-27 NOTE — PROCEDURES
[facility]     SUMMARY     Ordering Provider: Dr Mccloud   Interpreting Provider: Dr Mccloud  Performing nurse/tech/RT: ALEX RRT  Diagnosis:  (exposure to industrial fumes, hypoxemia,asthma with copd)  Height: 5' 11" (180.3 cm)  Weight: 78.7 kg (173 lb 8 oz)  BMI (Calculated): 24.2   Patient Race:             Phase Oxygen Assessment Supplemental O2 Heart   Rate Blood Pressure Melissa Dyspnea Scale Rating   Resting 96 % Room Air 72 bpm 108/68 3   Exercise        Minute        1 92 % Room Air 95 bpm     2 91 % Room Air 105 bpm     3 91 % Room Air 113 bpm     4 91 % Room Air 113 bpm     5 90 % Room Air 115 bpm     6  91 % Room Air 116 bpm 128/72 5-6   Recovery        Minute        1 93 % Room Air 94 bpm     2 94 % Room Air 83 bpm     3 93 % Room Air 79 bpm     4 93 % Room Air 77 bpm 120/73 4     Six Minute Walk Summary  6MWT Status: completed without stopping  Patient Reported: Dyspnea, Lightheadedness, Chest pain     Interpretation:  Did the patient stop or pause?: No                                         Total Time Walked (Calculated): 360 seconds  Final Partial Lap Distance (feet): 25 feet  Total Distance Meters (Calculated): 434.34 meters  Predicted Distance Meters (Calculated): 560.87 meters  Percentage of Predicted (Calculated): 77.44  Peak VO2 (Calculated): 17.01  Mets: 4.86  Has The Patient Had a Previous Six Minute Walk Test?: No       Previous 6MWT Results  Has The Patient Had a Previous Six Minute Walk Test?: No      Interpretation:  Total distance walked in six minutes is mildly reduced indicating a reduction in overall  functional capacity. The patient did not meet criteria for supplemental oxygen prescription.  Clinical correlation suggested.  [] Mild exercise-induced hypoxemia described as an arterial oxygen saturation of 93-95% (with a fall of 3-4% with exercise),   [x] Moderate exercise-induced hypoxemia as a fall in oxygen saturation to  89-93% (with a fall " of 3-4 % with exercise)  [] Severe exercise induced hypoxemia as < 89% O2 saturation (88% and below).  Medicare Criteria for Oxygen prescription comments: When arterial oxygen saturation is at or below 88% during exercise (severe exercise induced hypoxemia) then the patient falls under   Details about Medicare Group Criteria coverage can be found at http://[email or web address]/manuals/downloads/     Guanakito Mccloud MD

## 2023-08-01 ENCOUNTER — TELEPHONE (OUTPATIENT)
Dept: PULMONOLOGY | Facility: CLINIC | Age: 53
End: 2023-08-01
Payer: MEDICAID

## 2023-08-01 NOTE — TELEPHONE ENCOUNTER
----- Message from Savanna Mendoza sent at 8/1/2023 10:19 AM CDT -----  Contact: Patient, [phone]  2TESTRESULTS    Type: Test Results    What test was performed?  CT Scan    Who ordered the test?  Dr Mccolud    When and where were the test performed? 07/27/2023    Would you like response via [employer]: Call back    Comments:  Please call her. Thanks.

## 2023-08-02 ENCOUNTER — PATIENT MESSAGE (OUTPATIENT)
Dept: PULMONOLOGY | Facility: CLINIC | Age: 53
End: 2023-08-02
Payer: MEDICAID

## 2023-08-02 NOTE — TELEPHONE ENCOUNTER
Called about CT scan and 6 min walk  Very little improvement with medicatinos prescribed on last visit  CT of chest consistent with advanced congenital heart disease  6 min walk consistent with mild impairment

## 2023-08-10 ENCOUNTER — TELEPHONE (OUTPATIENT)
Dept: SMOKING CESSATION | Facility: CLINIC | Age: 53
End: 2023-08-10
Payer: MEDICAID

## 2023-08-10 NOTE — TELEPHONE ENCOUNTER
Attempted to contact pt regarding rescheduling her cancelled [facility] appt.  Called pt.  No answer.  Unable to leave a voice message.  Mailbox is full.

## 2023-10-26 DIAGNOSIS — R13.10 DYSPHAGIA: ICD-10-CM

## 2023-10-26 DIAGNOSIS — J02.9 SORE THROAT: Primary | ICD-10-CM

## 2023-10-27 ENCOUNTER — OFFICE VISIT (OUTPATIENT)
Dept: OTOLARYNGOLOGY | Facility: CLINIC | Age: 53
End: 2023-10-27
Payer: COMMERCIAL

## 2023-10-27 VITALS
DIASTOLIC BLOOD PRESSURE: 85 MMHG | TEMPERATURE: 98 F | WEIGHT: 180 LBS | SYSTOLIC BLOOD PRESSURE: 121 MMHG | HEART RATE: 76 BPM | BODY MASS INDEX: 25.2 KG/M2 | HEIGHT: 71 IN

## 2023-10-27 DIAGNOSIS — K21.9 LPRD (LARYNGOPHARYNGEAL REFLUX DISEASE): ICD-10-CM

## 2023-10-27 DIAGNOSIS — R13.10 DYSPHAGIA: ICD-10-CM

## 2023-10-27 DIAGNOSIS — Z77.098 EXPOSURE TO CHEMICAL INHALATION: ICD-10-CM

## 2023-10-27 DIAGNOSIS — R13.10 ODYNOPHAGIA: ICD-10-CM

## 2023-10-27 DIAGNOSIS — J02.9 SORE THROAT: Primary | ICD-10-CM

## 2023-10-27 DIAGNOSIS — Z72.0 TOBACCO USE: ICD-10-CM

## 2023-10-27 DIAGNOSIS — R13.10 DYSPHAGIA, UNSPECIFIED TYPE: ICD-10-CM

## 2023-10-27 PROCEDURE — 99204 OFFICE O/P NEW MOD 45 MIN: CPT | Mod: 25,S$PBB,, | Performed by: NURSE PRACTITIONER

## 2023-10-27 PROCEDURE — 99204 PR OFFICE/OUTPT VISIT, NEW, LEVL IV, 45-59 MIN: ICD-10-PCS | Mod: 25,S$PBB,, | Performed by: NURSE PRACTITIONER

## 2023-10-27 PROCEDURE — 31575 DIAGNOSTIC LARYNGOSCOPY: CPT | Mod: PBBFAC | Performed by: NURSE PRACTITIONER

## 2023-10-27 PROCEDURE — 31575 DIAGNOSTIC LARYNGOSCOPY: CPT | Mod: S$PBB,,, | Performed by: NURSE PRACTITIONER

## 2023-10-27 PROCEDURE — 99214 OFFICE O/P EST MOD 30 MIN: CPT | Mod: PBBFAC | Performed by: NURSE PRACTITIONER

## 2023-10-27 PROCEDURE — 31575 PR LARYNGOSCOPY, FLEXIBLE; DIAGNOSTIC: ICD-10-PCS | Mod: S$PBB,,, | Performed by: NURSE PRACTITIONER

## 2023-10-27 RX ORDER — PANTOPRAZOLE SODIUM 40 MG/1
40 TABLET, DELAYED RELEASE ORAL 2 TIMES DAILY
Qty: 60 TABLET | Refills: 1 | Status: SHIPPED | OUTPATIENT
Start: 2023-10-27 | End: 2024-10-26

## 2023-10-27 RX ORDER — LIDOCAINE HYDROCHLORIDE 40 MG/ML
1 INJECTION, SOLUTION RETROBULBAR ONCE
Status: DISPENSED | OUTPATIENT
Start: 2023-10-27

## 2023-10-27 NOTE — PROGRESS NOTES
The scope used for the exam was:  Flexible scope ENF-P4  Serial Number:  1)    [id number]    []   2)    [id number]    []   3)    [id number]    [x]   4)    [id number]    []   5)    [id number]    []   6)    [id number]    []       The scope used for the exam was:  Rigid scope   Serial Number:  1)   [id number]    []   2)   [id number]    []   3)   [id number]    []   4)    3384   []   5)    0824   []   6)    5554   []     7)   7425    []   8)   2240    []   9)   1109    []

## 2023-10-27 NOTE — PROGRESS NOTES
[facility]  Otolaryngology Clinic Note    Letitia Gibson  YOB: 1970    Chief Complaint: sore throat    HPI: 10/27/2023: 53yoF referred for throat concerns. States this began 6mo ago following repeated exposure to occupational chemical spills. She c/o burning in her throat and odynophagia. Denies choking but states that solids sometimes feel tight going down. She has been eating a lot of soup recently. Denies use of GERD/antacid medications. Denies otalgia or dysphonia. Denies fever/chills. Reports weight gain. Denies hematemesis or SOB but states that she coughs up pink to red bloody sputum roughly twice a week. Smoker of 2-3 cigarettes/day. Has already seen pulmonology and reports being told she had some damage from chemical exposure. This was recommended by her .    ROS:   10-point review of systems negative except per HPI      Review of patient's allergies indicates:   Allergen Reactions    Codeine        Past Medical History:   Diagnosis Date    Anxiety disorder, unspecified     ASD (atrial septal defect)     Murmur        Past Surgical History:   Procedure Laterality Date    CARDIAC CATHETERIZATION  2017    HYSTERECTOMY      RIGHT HEART CATHETERIZATION Right 08/26/2022    Procedure: INSERTION, CATHETER, RIGHT HEART;  Surgeon: Rogelio Kaufman MD;  Location: [facility] CATH LAB;  Service: Cardiology;  Laterality: Right;  [facility]HC W/ SHUNT RUN       Social History     Socioeconomic History    Marital status:    Tobacco Use    Smoking status: Every Day     Current packs/day: 0.00     Average packs/day: 0.5 packs/day for 34.8 years (17.4 ttl pk-yrs)     Types: Cigarettes     Start date: 1/1/1989     Last attempt to quit: 10/26/2023    Smokeless tobacco: Never    Tobacco comments:     I smoke about three cigarettes a day   Substance and Sexual Activity    Alcohol use: Never    Drug use: Never    Sexual activity: Not Currently     Partners: Female     Birth  "control/protection: None       No family history on file.    Outpatient Encounter Medications as of 10/27/2023   Medication Sig Dispense Refill    diltiaZEM (CARDIZEM) 30 MG tablet Take 30 mg by mouth 3 (three) times daily.      furosemide (LASIX) 40 MG tablet Take 40 mg by mouth once daily.      potassium chloride (KLOR-CON) 10 MEQ TbSR Take 20 mEq by mouth once.      albuterol (PROVENTIL) 2.5 mg /3 mL (0.083 %) nebulizer solution Take 3 mLs (2.5 mg total) by nebulization every 4 to 6 hours as needed for Wheezing or Shortness of Breath. (Patient not taking: Reported on 10/27/2023) 360 mL 11    albuterol (PROVENTIL/VENTOLIN HFA) 90 mcg/actuation inhaler Inhale 2 puffs into the lungs every 4 (four) hours as needed for Wheezing or Shortness of Breath. (Patient not taking: Reported on 10/27/2023) 18 g 11    budesonide-glycopyr-formoterol 160-9-4.8 mcg/actuation HFAA Inhale 2 puffs into the lungs 2 (two) times daily. Wash out mouth after use. (Patient not taking: Reported on 10/27/2023) 10.7 g 11    doxycycline (VIBRAMYCIN) 100 MG Cap Take 1 capsule (100 mg total) by mouth 2 (two) times daily. (Patient not taking: Reported on 10/27/2023) 20 capsule 0    predniSONE (DELTASONE) 20 MG tablet Prednisone 60 mg/ day for 3 days, 40 mg/day for 3 days,20 mg/ day for 3 days, (1/2 tablet )10 mg a day for 3 days. (Patient not taking: Reported on 10/27/2023) 20 tablet 0     Facility-Administered Encounter Medications as of 10/27/2023   Medication Dose Route Frequency Provider Last Rate Last Admin    diphenhydrAMINE capsule 50 mg  50 mg Oral On Call Procedure Rogelio Kaufman MD   50 mg at 08/26/22 0654       Physical Exam:  Vitals:    10/27/23 0902   BP: 121/85   BP Location: Right arm   Patient Position: Sitting   BP Method: Large (Automatic)   Pulse: 76   Temp: 98.2 °F (36.8 °C)   TempSrc: Oral   Weight: 81.6 kg (180 lb)   Height: 5' 11" (1.803 m)       General: NAD, voice normal  Neuro: AAO, CN II - XII grossly intact  Head/ " Face: NCAT, symmetric, sensations intact bilaterally  Eyes: EOMI, PERRL  Ears: externally normal with grossly normal hearing  AD: EAC patent, TM intact, no middle ear effusion, no retractions  AS: EAC patent, TM intact, no middle ear effusion, no retractions  Nose: bilateral nares patent, midline septum, no rhinorrhea, no external deformity, no turbinate hypertrophy, mucosa is dry  OC/OP: MMM, no intraoral lesions, FOM/BOT soft, no trismus, dentition is poor with extensive decay, no uvular deviation, bilaterally symmetric soft palate elevation, palatoglossus and palatopharyngeal fold wnl; tonsils are symmetric and 1+  Indirect laryngoscopy: deferred due to patient intolerance  Neck: soft, supple, no LAD, normal ROM, no thyromegaly  Respiratory: nonlabored, no wheezing, bilateral chest rise  Cardiovascular: RRR  Gastrointestinal: S NT ND  Skin: warm, no lesions  Musculoskeletal: 5/5 strength  Psych: Appropriate affect/mood     Flexible Fiberoptic Laryngoscopy/Nasopharyngoscopy with Dr. Vega    Procedure in Detail: Informed consent was obtained from the patient after explanation of procedure, indications, risks and benefits. Flexible endoscopy was performed through the nasal passages. The nasal cavity, nasopharynx, oropharynx, hypopharynx and larynx were adequately visualized. The true vocal cords and arytenoids were examined during phonation and repose.    Anesthesia: Topical Neosynephrine / Lidocaine  Adverse Events: None  Blood loss: none  Condition: good    Findings:  NP/OP: no masses/lesions of NC, eustachian tube, fossa of Rosenmuller, no adenoid hypertrophy  BOT/vallecula: no lingual hypertrophy, no masses/lesions, no secretions obscuring visualization  Piriform sinuses/post-cricoid: no masses/lesions, no pooling of secretions  Epiglottis: lingual and laryngeal surfaces within normal limits  Arytenoids/FVFs: no masses/lesions, no edema, bilateral mobility. There is moderate generalized erythema  TVCs:  bilateral cord mobility, no masses or lesions, mild erythema  No aspiration, no pooled secretions  No sign of malignancy      Pertinent Data:  ? LABS:  ? AUDIO:           ? PATH:      Imaging:   I personally reviewed the following images:        Assessment/Plan:  53 y.o. female referred for throat pain/dysphagia/odynophagia following chemical exposure.   - Protonix BID x 1mo, then wean to daily  - GERD lifestyle modifications  - Esophagram  - Smoking cessation  - RTC 2-3mo    Mireya Cagle NP

## (undated) DEVICE — CATH SWAN GANZ STND 7FR

## (undated) DEVICE — Device

## (undated) DEVICE — PAD HEARTSTART DEFIB ADULT

## (undated) DEVICE — KIT MINI STK MAX COAX 5FR 10CM

## (undated) DEVICE — GUIDEWIRE SAFE-T-J CRV 180CM

## (undated) DEVICE — GUIDEWIRE EMERALD 3MM 175X5CM

## (undated) DEVICE — SHEATH INTRODUCER 7FR 11CM

## (undated) DEVICE — CANNULA NASAL ADULT